# Patient Record
Sex: FEMALE | Race: WHITE | NOT HISPANIC OR LATINO | Employment: FULL TIME | URBAN - METROPOLITAN AREA
[De-identification: names, ages, dates, MRNs, and addresses within clinical notes are randomized per-mention and may not be internally consistent; named-entity substitution may affect disease eponyms.]

---

## 2024-02-26 ENCOUNTER — HOSPITAL ENCOUNTER (OUTPATIENT)
Dept: RADIOLOGY | Facility: CLINIC | Age: 49
Discharge: HOME | End: 2024-02-26
Payer: COMMERCIAL

## 2024-02-26 ENCOUNTER — OFFICE VISIT (OUTPATIENT)
Dept: ORTHOPEDIC SURGERY | Facility: CLINIC | Age: 49
End: 2024-02-26
Payer: COMMERCIAL

## 2024-02-26 DIAGNOSIS — E66.01 OBESITIES, MORBID (MULTI): ICD-10-CM

## 2024-02-26 DIAGNOSIS — M17.12 PRIMARY OSTEOARTHRITIS OF LEFT KNEE: Primary | ICD-10-CM

## 2024-02-26 DIAGNOSIS — M25.562 LEFT KNEE PAIN, UNSPECIFIED CHRONICITY: ICD-10-CM

## 2024-02-26 PROCEDURE — 99203 OFFICE O/P NEW LOW 30 MIN: CPT | Performed by: STUDENT IN AN ORGANIZED HEALTH CARE EDUCATION/TRAINING PROGRAM

## 2024-02-26 PROCEDURE — 73564 X-RAY EXAM KNEE 4 OR MORE: CPT | Mod: LEFT SIDE | Performed by: STUDENT IN AN ORGANIZED HEALTH CARE EDUCATION/TRAINING PROGRAM

## 2024-02-26 PROCEDURE — 73564 X-RAY EXAM KNEE 4 OR MORE: CPT | Mod: LT

## 2024-02-26 PROCEDURE — 99213 OFFICE O/P EST LOW 20 MIN: CPT | Performed by: STUDENT IN AN ORGANIZED HEALTH CARE EDUCATION/TRAINING PROGRAM

## 2024-02-26 NOTE — PROGRESS NOTES
Chief Complaint   Patient presents with    Left Knee - Pain     Xrays today   HX : MRI and xrays in 2023 @ [facility]       HPI  48-year-old female with longstanding history of left knee pain also known history of rheumatoid arthritis.  His recurrent history of chronic effusion likely related to rheumatoid.  Status post aspiration and cortisone injection by [facility].  States that she has her good days and bad days.  Some better than others currently seeing rheumatology.  Well-known to [facility] Dr. Severino.    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  No acute distress and breathing comfortably; pleasant and cooperative with the examination.  HEENT: Head is normocephalic and atraumatic.  Neck: Supple, no visible swelling.  Cardiovascular: Good perfusion to the affected extremity.  Lungs: No audible wheezing or labored breathing.  Abdomen: Nondistended  HEME/Lymph : No visible abnormalities bilateral lower extremity    Extremity:  Left knee:  Skin healthy and intact  No gross swelling or ecchymosis  Alignment: Neutral  Effusion: Mild  ROM: 10-1 10  Crepitance with range of motion  No pain with internal rotation of the hip  Tenderness to palpation: Peripatellar     No laxity to valgus stress  No laxity to varus stress  Negative Lachman´s test  Negative posterior drawer test  Mild pain with Alo´s test     Neurovascular exam normal distally  2+ DP pulse and good cap refill    Diagnostics:  XR knee left 4+ views    Result Date: 2/26/2024  Interpreted By:  Praveen Moreira III, STUDY: XR KNEE LEFT 4+ VIEWS; ;  2/26/2024 4:05 pm   INDICATION: Signs/Symptoms:pain.   COMPARISON: None.   ACCESSION NUMBER(S): [id number]   ORDERING CLINICIAN:  SHAYNE MOSQUERA   FINDINGS: 4 weight-bearing views left knee moderate degenerative changes about the 3 compartments of the knee. Bony definition limited due to body habitus. There is osteophytosis sclerosis consistent with mild to moderate knee arthritis.       No acute osseous abnormality     MACRO: None   Signed by: Shayne Mosquera III 2/26/2024 4:45 PM Dictation workstation:   [id number]       Procedure:  Procedures    Assessment:  48-year-old female with mild/moderate knee arthritis    Treatment plan:  The natural history of the condition and its associated treatment alternatives including surgical and nonsurgical options were discussed with the patient at length.  Patient has already attempted aspiration injection/physical therapy, oral anti-inflammatories continues have pain and discomfort about her knee.  Given findings of moderate arthritis may benefit from gel injection therapy.  She does have elevated BMI which is likely contributing to some of this pain and discomfort therefore I do think that she would benefit from bariatric surgery referral.  Will provide this today.  Will see how she does with gel injection therapy.  Feels improvement benefit from repeat MRI for discussion of diagnostic knee arthroscopy.  Given patient's elevated BMI not currently a candidate for total knee arthroplasty.  All of the patient's questions were answered.

## 2024-02-28 ENCOUNTER — TELEPHONE (OUTPATIENT)
Dept: SURGERY | Facility: CLINIC | Age: 49
End: 2024-02-28
Payer: COMMERCIAL

## 2024-03-01 ENCOUNTER — TELEPHONE (OUTPATIENT)
Dept: ORTHOPEDIC SURGERY | Facility: CLINIC | Age: 49
End: 2024-03-01
Payer: COMMERCIAL

## 2024-03-01 NOTE — TELEPHONE ENCOUNTER
Pt called and said that a bariatric surgery referral was put in , and she said that the bariatric office reached out , the pt thought it was for nutritional help and the office said it was for the sx . Pt wants referral changed. Can you please clarify with Dr Moreira.

## 2024-03-04 DIAGNOSIS — M17.12 PRIMARY OSTEOARTHRITIS OF LEFT KNEE: ICD-10-CM

## 2024-03-04 DIAGNOSIS — E66.01 OBESITIES, MORBID (MULTI): ICD-10-CM

## 2024-03-05 ENCOUNTER — TELEPHONE (OUTPATIENT)
Dept: SURGERY | Facility: CLINIC | Age: 49
End: 2024-03-05
Payer: COMMERCIAL

## 2024-03-07 ENCOUNTER — TELEPHONE (OUTPATIENT)
Dept: ORTHOPEDIC SURGERY | Facility: CLINIC | Age: 49
End: 2024-03-07
Payer: COMMERCIAL

## 2024-03-07 DIAGNOSIS — E66.01 OBESITIES, MORBID (MULTI): ICD-10-CM

## 2024-03-07 DIAGNOSIS — M17.12 PRIMARY OSTEOARTHRITIS OF LEFT KNEE: ICD-10-CM

## 2024-03-07 NOTE — TELEPHONE ENCOUNTER
Rosetta left message on 03/06/2024 wanting to know which series of gel injections would be better, insurance approved 3 injections, but she wants to know if she should get the 1 injection or the series of 3, which would be best for her.    The order that was put in for nutritionist, appointment can not be made because it says its for surgery. She would like a call back regarding these questions.

## 2024-03-07 NOTE — TELEPHONE ENCOUNTER
Has to do what the insurance approves.  There really isn't a difference between the 3 series and the one.      I placed a new order for referral to nutrition

## 2024-03-08 NOTE — TELEPHONE ENCOUNTER
Called patient, evelia letting her know that there really is no difference in the gel injections, you are getting the same amount just a little at a time with the 3 series and that the one injection is more painful because you are getting a lot of gel into the knee at one time.  I also told her that we have to do what the insurance approves, so if they approved the 3 series that is what we have to do.    Told her that I placed a new referral to a nutritionist, so they will be calling her in the next few days to schedule.

## 2024-03-18 ENCOUNTER — NUTRITION (OUTPATIENT)
Dept: NUTRITION | Facility: CLINIC | Age: 49
End: 2024-03-18
Payer: COMMERCIAL

## 2024-03-18 VITALS — BODY MASS INDEX: 40.82 KG/M2 | WEIGHT: 245 LBS | HEIGHT: 65 IN

## 2024-03-18 DIAGNOSIS — E66.01 OBESITIES, MORBID (MULTI): ICD-10-CM

## 2024-03-18 DIAGNOSIS — M17.12 PRIMARY OSTEOARTHRITIS OF LEFT KNEE: ICD-10-CM

## 2024-03-18 PROCEDURE — 97802 MEDICAL NUTRITION INDIV IN: CPT | Performed by: STUDENT IN AN ORGANIZED HEALTH CARE EDUCATION/TRAINING PROGRAM

## 2024-03-18 NOTE — PATIENT INSTRUCTIONS
Nutrition Education Material: Achieving a Healthy Weight, AND: 20 Ways to Enjoy More Fruits and Vegetables, Mindful Eating, NCM: Weight Loss Tips, My Weight Management Plan, and Weekly     Provided patient with my office phone # and email address for any further questions.

## 2024-03-18 NOTE — PROGRESS NOTES
"Nutrition Assessment     Reason for Visit:  Rosetta Morton is a 48 y.o. female who presents for Obesity    Anthropometrics:  Anthropometrics  Height: 165.1 cm (5' 5\")  Weight: 111 kg (245 lb)  BMI (Calculated): 40.77    Significant Past Medical Hx:  Rheumatoid Arthritis    Biochemical/Laboratory Data:  No results found for: \"HGBA1C\", \"CHOL\", \"LDLF\", \"TRIG\"     Pertinent Medications:  Folic Acid, Methotrexate, Plaquenil     Food And Nutrient Intake:  Food and Nutrient History  Food and Nutrient History: Met w/ pt to obtain diet hx and instruct on diet strategies for weight loss d/t obesity.  Reports she hurt her knee 1 year ago and since then has gained a lot of weight.  She was walking a lot, but now has limited physical activity.  Should soon be getting a knee injection which she hopes will help.  Works ~70 hours/wk so frequently skips meals, eats processed foods and eats large portions when she does eat.  Diet limited in fruits and vegs.  Fluid Intake: Drinks 1 can coke/d (for caffeine), water, hot green tea  Food Allergy: strawberries  Food Intolerance: none     Food Intake  Meal 1: skips  Meal 2: cafe at work: deli sandwich or soup with baked chips or cup of pasta or potato salad  Meal 3: Lean Cusine, nothing with it OR pasta, maybe veg, limited protein  Snacks: masha chips, guacamole, popcorn, doritos, occasional cake    Food Preparation  Cooking: Patient  Grocery Shopping: Patient  Dining Out: More than 3 times a week  Grocery Shopping Schedule: Has no shopping plan - goes as needed.   Convenience/Processed Foods: Frozen Meals, Processed/Frozen Convenience Meals, and Processed Salty Snacks  daily  Cooking Skills/Barriers: Low preference for cooking  Meal Preparation Habits: Has cooking skills, but does little cooking, Little preplanning of meals, Tends to use more prepared or convenience foods, and Generally does not eat 1 fruit or 1 vegetable each day.   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     Nutrition Diagnosis      Nutrition Diagnosis  Patient has Nutrition Diagnosis: Yes  Diagnosis Status (1): New  Nutrition Diagnosis 1: Obese  Related to (1): energy intake in excess of energy expenditure  As Evidenced by (1): diet hx and BMI 40    Nutrition Interventions/Recommendations   Food and Nutrition Delivery  Meals & Snacks: General Healthful Diet, Energy-modified diet  Nutrition Education  Nutrition Education Content: Content related nutrition education, Education on nutrition's influence on health  Goals: 1) Will aim to have consistent meal/snack times  2) Will aim to do meal prepping for 5 meals/week  3) Will aim to have at least 1 piece of fruit/d  4) Will aim to have 1-2 fistful of vegs with lunch and dinner  5) Will add movement as MD allows  Reviewed w/ pt strategies for weight loss including:  benefits of consistent meal and snack times;  mindful eating and paying attention to hunger and fullness cues;  MyPlate guidance for portions sizes;  strategies to increase fruits and vegetables in diet;  strategies for limiting low nutritional value foods; benefits of, and strategies for, menu planning and prepping meals ahead of meal times; benefits of fiber and protein with meals and snacks; benefits of regular movement/exercise.     Nutrition Monitoring and Evaluation   Food/Nutrient Related History Monitoring  Monitoring and Evaluation Plan: Meal/snack pattern, Protein intake, Fiber intake, Carbohydrate intake  Meal/Snack Pattern: Estimated meal and snack pattern, Food variety  Criteria: meals follow MyPlate guidelines  Estimated protein intake: Estimated protein intake  Criteria: source of protein included at meals/snacks  Estimated  carbohydrate intake: Estimated carbohydrate intake  Criteria: carb portion reduced at meals (1/4 or 1 C)  Estimated fiber intake: Estimated fiber intake  Criteria: includes source of fiber with meals/snacks  Body Composition/Growth/Weight History  Monitoring and Evaluation Plan: Weight  Weight: Measured weight  Criteria: weight loss of 1-2#/wk  Evaluation of effectiveness of diet intervention/diet eduction include:  changes in biochemical data; changes in anthropometric measurements; changes in diet hx; patient understanding and implementation of goals set with patient and diet education provided.

## 2024-03-29 ENCOUNTER — OFFICE VISIT (OUTPATIENT)
Dept: ORTHOPEDIC SURGERY | Facility: CLINIC | Age: 49
End: 2024-03-29
Payer: COMMERCIAL

## 2024-03-29 DIAGNOSIS — M17.12 PRIMARY OSTEOARTHRITIS OF LEFT KNEE: Primary | ICD-10-CM

## 2024-03-29 PROBLEM — M06.9 RHEUMATOID ARTHRITIS (MULTI): Status: ACTIVE | Noted: 2024-03-29

## 2024-03-29 PROBLEM — M25.562 ACUTE PAIN OF LEFT KNEE: Status: ACTIVE | Noted: 2023-01-11

## 2024-03-29 PROBLEM — E55.9 VITAMIN D DEFICIENCY: Status: ACTIVE | Noted: 2024-03-29

## 2024-03-29 PROBLEM — R26.2 DIFFICULTY WALKING: Status: ACTIVE | Noted: 2023-01-11

## 2024-03-29 PROBLEM — S83.242A TEAR OF MEDIAL MENISCUS OF LEFT KNEE: Status: ACTIVE | Noted: 2023-05-15

## 2024-03-29 PROBLEM — H52.4 PRESBYOPIA OF BOTH EYES: Status: ACTIVE | Noted: 2020-02-17

## 2024-03-29 PROBLEM — H52.223 REGULAR ASTIGMATISM OF BOTH EYES: Status: ACTIVE | Noted: 2018-11-21

## 2024-03-29 PROCEDURE — 20610 DRAIN/INJ JOINT/BURSA W/O US: CPT | Performed by: STUDENT IN AN ORGANIZED HEALTH CARE EDUCATION/TRAINING PROGRAM

## 2024-03-29 PROCEDURE — 1036F TOBACCO NON-USER: CPT | Performed by: STUDENT IN AN ORGANIZED HEALTH CARE EDUCATION/TRAINING PROGRAM

## 2024-03-29 RX ORDER — HYDROXYCHLOROQUINE SULFATE 200 MG/1
1 TABLET, FILM COATED ORAL 2 TIMES DAILY
COMMUNITY

## 2024-03-29 RX ORDER — METHOTREXATE 2.5 MG/1
TABLET ORAL
COMMUNITY
Start: 2024-03-27

## 2024-03-29 RX ORDER — FOLIC ACID 1 MG/1
1 TABLET ORAL
COMMUNITY
Start: 2021-01-27 | End: 2024-04-01

## 2024-03-29 ASSESSMENT — PAIN - FUNCTIONAL ASSESSMENT: PAIN_FUNCTIONAL_ASSESSMENT: NO/DENIES PAIN

## 2024-03-29 NOTE — PROGRESS NOTES
History of Present Illness  Patient returns today for an injection with viscosupplementation. The patient endorsing knee pain refractory to cortisone injections and oral medications     Review of Systems   GENERAL: Negative for malaise, significant weight loss, fever  MUSCULOSKELETAL: see HPI  NEURO:  Negative     Exam  Trace effusion  Tenderness over medial and lateral joint line     Assessment:  Patient with known osteoarthritis of the knee     Plan:  Visco injection provided, patient tolerated it well. See procedure note.     Injection performed as detailed in the procedure note below.      PROCEDURE NOTE:  Physician:Praveen Moreira III, MD     Prior to the procedure, the patient's allergies were reviewed. The procedure site was marked and time out performed. The procedure team checked for proper functioning of devices and supplies to be used for the procedure. The procedure team reviewed the relevant diagnostic tests pertinent to the procedure. The risks, benefits and anticipated outcomes of the procedure, the risks and benefits of the alternatives to the procedure, and the roles and tasks of the personnel to be involved were discussed with the patient, the patient verbalized understanding and consenting to the procedure.        Procedure details:  The injection site was prepped in the usual sterile manner.   Ethyl chloride mist spray was used to numb the skin.    Gelsyn was injected into the left knee.  After the injection, a bandage was placed over the injection site.   The patient tolerated the procedure well with no adverse effects.   Post-injection instructions were reviewed with the patient and the patient voiced understanding of these instructions.      L Inj/Asp: L knee on 3/29/2024 11:12 AM  Indications: pain  Details: 21 G needle, anterolateral approach  Medications: 16.8 mg sodium hyaluronate 16.8 mg/2 mL  Outcome: tolerated well, no immediate complications  Procedure, treatment alternatives, risks  and benefits explained, specific risks discussed. Consent was given by the patient. Immediately prior to procedure a time out was called to verify the correct patient, procedure, equipment, support staff and site/side marked as required. Patient was prepped and draped in the usual sterile fashion.

## 2024-04-03 ENCOUNTER — OFFICE VISIT (OUTPATIENT)
Dept: ORTHOPEDIC SURGERY | Facility: CLINIC | Age: 49
End: 2024-04-03
Payer: COMMERCIAL

## 2024-04-03 DIAGNOSIS — M17.12 PRIMARY OSTEOARTHRITIS OF LEFT KNEE: Primary | ICD-10-CM

## 2024-04-03 PROCEDURE — 1036F TOBACCO NON-USER: CPT | Performed by: STUDENT IN AN ORGANIZED HEALTH CARE EDUCATION/TRAINING PROGRAM

## 2024-04-03 PROCEDURE — 20610 DRAIN/INJ JOINT/BURSA W/O US: CPT | Performed by: STUDENT IN AN ORGANIZED HEALTH CARE EDUCATION/TRAINING PROGRAM

## 2024-04-03 NOTE — PROGRESS NOTES
Chief Complaint   Patient presents with    Left Knee - Follow-up     Gelsyn #3     History of Present Illness  Patient returns today for an injection with viscosupplementation. The patient endorsing knee pain refractory to cortisone injections and oral medications     Review of Systems   GENERAL: Negative for malaise, significant weight loss, fever  MUSCULOSKELETAL: see HPI  NEURO:  Negative     Exam  Trace effusion  Tenderness over medial and lateral joint line     Assessment:  Patient with known osteoarthritis of the knee     Plan:  Visco injection provided, patient tolerated it well. See procedure note.     Injection performed as detailed in the procedure note below.      PROCEDURE NOTE:  Physician:Praveen Moreira III, MD     Prior to the procedure, the patient's allergies were reviewed. The procedure site was marked and time out performed. The procedure team checked for proper functioning of devices and supplies to be used for the procedure. The procedure team reviewed the relevant diagnostic tests pertinent to the procedure. The risks, benefits and anticipated outcomes of the procedure, the risks and benefits of the alternatives to the procedure, and the roles and tasks of the personnel to be involved were discussed with the patient, the patient verbalized understanding and consenting to the procedure.        Procedure details:  The injection site was prepped in the usual sterile manner.   Ethyl chloride mist spray was used to numb the skin.    Gelsyn was injected into the left knee.  After the injection, a bandage was placed over the injection site.   The patient tolerated the procedure well with no adverse effects.   Post-injection instructions were reviewed with the patient and the patient voiced understanding of these instructions.        L Inj/Asp: L knee on 4/3/2024 12:37 PM  Indications: pain  Details: 21 G needle, anterolateral approach  Medications: 16.8 mg sodium hyaluronate 16.8 mg/2 mL  Outcome:  tolerated well, no immediate complications  Procedure, treatment alternatives, risks and benefits explained, specific risks discussed. Consent was given by the patient. Immediately prior to procedure a time out was called to verify the correct patient, procedure, equipment, support staff and site/side marked as required. Patient was prepped and draped in the usual sterile fashion.

## 2024-04-04 ENCOUNTER — APPOINTMENT (OUTPATIENT)
Dept: ORTHOPEDIC SURGERY | Facility: CLINIC | Age: 49
End: 2024-04-04
Payer: COMMERCIAL

## 2024-04-15 ENCOUNTER — OFFICE VISIT (OUTPATIENT)
Dept: ORTHOPEDIC SURGERY | Facility: CLINIC | Age: 49
End: 2024-04-15
Payer: COMMERCIAL

## 2024-04-15 DIAGNOSIS — M17.12 PRIMARY OSTEOARTHRITIS OF LEFT KNEE: Primary | ICD-10-CM

## 2024-04-15 PROCEDURE — 20610 DRAIN/INJ JOINT/BURSA W/O US: CPT | Performed by: ORTHOPAEDIC SURGERY

## 2024-04-15 PROCEDURE — 1036F TOBACCO NON-USER: CPT | Performed by: ORTHOPAEDIC SURGERY

## 2024-04-15 NOTE — PROGRESS NOTES
Rosetta is here for Gelsyn-3 injection #3 into the left knee.    L Inj/Asp: L knee on 4/15/2024 3:45 PM  Details: 22 G needle, lateral approach  Medications: 16.8 mg sodium hyaluronate 16.8 mg/2 mL  Outcome: tolerated well, no immediate complications  Procedure, treatment alternatives, risks and benefits explained, specific risks discussed. Consent was given by the patient. Immediately prior to procedure a time out was called to verify the correct patient, procedure, equipment, support staff and site/side marked as required. Patient was prepped and draped in the usual sterile fashion.         She will ice and work on range of motion and exercises as directed.    She will follow up with Dr. Moreira in two months to assess progress.

## 2024-04-18 ENCOUNTER — APPOINTMENT (OUTPATIENT)
Dept: ORTHOPEDIC SURGERY | Facility: CLINIC | Age: 49
End: 2024-04-18
Payer: COMMERCIAL

## 2024-06-17 ENCOUNTER — APPOINTMENT (OUTPATIENT)
Dept: ORTHOPEDIC SURGERY | Facility: CLINIC | Age: 49
End: 2024-06-17
Payer: COMMERCIAL

## 2024-11-25 ENCOUNTER — OFFICE VISIT (OUTPATIENT)
Dept: ORTHOPEDIC SURGERY | Facility: CLINIC | Age: 49
End: 2024-11-25
Payer: COMMERCIAL

## 2024-11-25 DIAGNOSIS — M17.12 PRIMARY OSTEOARTHRITIS OF LEFT KNEE: Primary | ICD-10-CM

## 2024-11-25 PROCEDURE — 99213 OFFICE O/P EST LOW 20 MIN: CPT | Performed by: STUDENT IN AN ORGANIZED HEALTH CARE EDUCATION/TRAINING PROGRAM

## 2024-11-25 NOTE — PROGRESS NOTES
Chief Complaint   Patient presents with    Left Knee - Follow-up     Last Gelsyn (#3) 4/15/24       HPI  48-year-old female with longstanding history of left knee pain also known history of rheumatoid arthritis.  His recurrent history of chronic effusion likely related to rheumatoid.  Status post aspiration and cortisone injection by [facility] [place].  States that she has her good days and bad days.  Some better than others currently seeing rheumatology.  Well-known to [facility] [place] Dr. Severino.    We have proceeded with gel injections is provided very short course of relief continues to have pain about her knee.    Past Medical History:   Diagnosis Date    Pemphigus vulgaris (Multi) 01/31/2021    Pemphigus vulgaris       Past Surgical History:   Procedure Laterality Date    OTHER SURGICAL HISTORY  01/27/2021    No history of surgery        Allergies   Allergen Reactions    Azithromycin Unknown    Erythromycin (Bulk) GI Upset    Levofloxacin Unknown     Concerns that Levaquin brought on pemphigus    Tetracycline Diarrhea        Physical exam    General: Alert and oriented to place, person, and time.  No acute distress and breathing comfortably; pleasant and cooperative with the examination.  HEENT: Head is normocephalic and atraumatic.  Neck: Supple, no visible swelling.  Cardiovascular: Good perfusion to the affected extremity.  Lungs: No audible wheezing or labored breathing.  Abdomen: Nondistended  HEME/Lymph : No visible abnormalities bilateral lower extremity    Extremity:  Left knee:  Skin healthy and intact  No gross swelling or ecchymosis  Alignment: Neutral  Effusion: Mild  ROM: 10-1 10  Crepitance with range of motion  No pain with internal rotation of the hip  Tenderness to palpation: Peripatellar     No laxity to valgus stress  No laxity to varus stress  Negative Lachman´s test  Negative posterior drawer test  Mild pain with Alo´s test     Neurovascular exam normal distally  2+ DP pulse and  good cap refill    Diagnostics:  XR knee left 4+ views    Result Date: 2/26/2024  Interpreted By:  Shayne Mosquera III, STUDY: XR KNEE LEFT 4+ VIEWS; ;  2/26/2024 4:05 pm   INDICATION: Signs/Symptoms:pain.   COMPARISON: None.   ACCESSION NUMBER(S): [id number]   ORDERING CLINICIAN: SHAYNE MOSQUERA   FINDINGS: 4 weight-bearing views left knee moderate degenerative changes about the 3 compartments of the knee. Bony definition limited due to body habitus. There is osteophytosis sclerosis consistent with mild to moderate knee arthritis.       No acute osseous abnormality     MACRO: None   Signed by: Shayne Mosquera III 2/26/2024 4:45 PM Dictation workstation:   [id number]       Procedure:  Procedures    Assessment:  48-year-old female with mild/moderate knee arthritis    Treatment plan:  The natural history of the condition and its associated treatment alternatives including surgical and nonsurgical options were discussed with the patient at length.  Patient is going to provide MRI results so that I can review these for further discussion of potential treatment option.  Patient is hoping to proceed with diagnostic knee arthroscopy.  She has not candidate for knee replacement given current BMI.  She will follow-up next week for discussion of these results.  Operative and nonoperative treatment modalities considered and discussed with patient in detail   all of the patient's questions were answered.

## 2024-12-04 ENCOUNTER — OFFICE VISIT (OUTPATIENT)
Dept: ORTHOPEDIC SURGERY | Facility: CLINIC | Age: 49
End: 2024-12-04
Payer: COMMERCIAL

## 2024-12-04 DIAGNOSIS — S83.242A ACUTE MEDIAL MENISCAL TEAR, LEFT, INITIAL ENCOUNTER: Primary | ICD-10-CM

## 2024-12-04 PROCEDURE — 99214 OFFICE O/P EST MOD 30 MIN: CPT | Performed by: STUDENT IN AN ORGANIZED HEALTH CARE EDUCATION/TRAINING PROGRAM

## 2024-12-04 PROCEDURE — 99213 OFFICE O/P EST LOW 20 MIN: CPT | Performed by: STUDENT IN AN ORGANIZED HEALTH CARE EDUCATION/TRAINING PROGRAM

## 2024-12-04 NOTE — PROGRESS NOTES
Chief Complaint   Patient presents with    Left Knee - Follow-up     MRI review       HPI  48-year-old female with longstanding history of left knee pain also known history of rheumatoid arthritis.  His recurrent history of chronic effusion likely related to rheumatoid.  Status post aspiration and cortisone injection by [facility].  States that she has her good days and bad days.  Some better than others currently seeing rheumatology.  Well-known to [facility] Dr. Severino.    We have proceeded with gel injections is provided very short course of relief continues to have pain about her knee.  Patient did get an MRI 2023 presents for discussion of these results at [facility]    Past Medical History:   Diagnosis Date    Pemphigus vulgaris (Multi) 01/31/2021    Pemphigus vulgaris       Past Surgical History:   Procedure Laterality Date    OTHER SURGICAL HISTORY  01/27/2021    No history of surgery        Allergies   Allergen Reactions    Azithromycin Unknown    Erythromycin (Bulk) GI Upset    Levofloxacin Unknown     Concerns that Levaquin brought on pemphigus    Tetracycline Diarrhea        Physical exam    General: Alert and oriented to place, person, and time.  No acute distress and breathing comfortably; pleasant and cooperative with the examination.  HEENT: Head is normocephalic and atraumatic.  Neck: Supple, no visible swelling.  Cardiovascular: Good perfusion to the affected extremity.  Lungs: No audible wheezing or labored breathing.  Abdomen: Nondistended  HEME/Lymph : No visible abnormalities bilateral lower extremity    Extremity:  Left knee:  Skin healthy and intact  No gross swelling or ecchymosis  Alignment: Neutral  Effusion: Mild  ROM: 10-1 10  Crepitance with range of motion  No pain with internal rotation of the hip  Tenderness to palpation: Peripatellar     No laxity to valgus stress  No laxity to varus stress  Negative Lachman´s test  Negative posterior drawer test  Mild pain  with Alo´s test     Neurovascular exam normal distally  2+ DP pulse and good cap refill    Diagnostics:  XR knee left 4+ views    Result Date: 2/26/2024  Interpreted By:  Shayne Mosquera III, STUDY: XR KNEE LEFT 4+ VIEWS; ;  2/26/2024 4:05 pm   INDICATION: Signs/Symptoms:pain.   COMPARISON: None.   ACCESSION NUMBER(S): [id number]   ORDERING CLINICIAN: SHAYNE MOSQUERA   FINDINGS: 4 weight-bearing views left knee moderate degenerative changes about the 3 compartments of the knee. Bony definition limited due to body habitus. There is osteophytosis sclerosis consistent with mild to moderate knee arthritis.       No acute osseous abnormality     MACRO: None   Signed by: Shayne Mosquera III 2/26/2024 4:45 PM Dictation workstation:   [id number]     MRI reviewed my interpretation as follows MRI date May 21, 2023: Large complex effusion about the knee with synovitis, low-grade MCL sprain,  Procedure:  Procedures    Assessment:  48-year-old female with mild/moderate knee arthritis, concerns for meniscus tear, history of rheumatoid arthritis    Treatment plan:  Given MRI was over a year ago I do recommend to proceed with updated MRI prior to further treatment  Given mechanical symptoms I do think patient has a meniscus tear likely leading to her a lot of pain and discomfort.  Will get an updated MRI.  The history and clinical exam are consistent with intraarticular pathology and therefore MRI is medically indicated to evaluate the soft tissues of the joint. Follow up in one week or after completion of the MRI to advance management accordingly  The patient understands and agrees with the plan.  Given her history of rheumatoid arthritis I do think that there also may be a component here that may be leading to some pain and discomfort.  Patient is tired of feeling these mechanical type symptoms does wish to proceed with surgical treatment.

## 2024-12-23 ENCOUNTER — HOSPITAL ENCOUNTER (OUTPATIENT)
Dept: RADIOLOGY | Facility: CLINIC | Age: 49
Discharge: HOME | End: 2024-12-23
Payer: COMMERCIAL

## 2024-12-23 DIAGNOSIS — S83.242A ACUTE MEDIAL MENISCAL TEAR, LEFT, INITIAL ENCOUNTER: ICD-10-CM

## 2024-12-23 PROCEDURE — 73721 MRI JNT OF LWR EXTRE W/O DYE: CPT | Mod: LT

## 2024-12-23 PROCEDURE — 73721 MRI JNT OF LWR EXTRE W/O DYE: CPT | Mod: LEFT SIDE | Performed by: RADIOLOGY

## 2024-12-27 ENCOUNTER — OFFICE VISIT (OUTPATIENT)
Dept: ORTHOPEDIC SURGERY | Facility: CLINIC | Age: 49
End: 2024-12-27
Payer: COMMERCIAL

## 2024-12-27 DIAGNOSIS — S83.242A ACUTE MEDIAL MENISCAL TEAR, LEFT, INITIAL ENCOUNTER: Primary | ICD-10-CM

## 2024-12-27 PROCEDURE — 99214 OFFICE O/P EST MOD 30 MIN: CPT | Mod: 25 | Performed by: STUDENT IN AN ORGANIZED HEALTH CARE EDUCATION/TRAINING PROGRAM

## 2024-12-27 NOTE — PROGRESS NOTES
Chief Complaint   Patient presents with    Left Knee - Follow-up     MMT  MRI review        HPI  48-year-old female with longstanding history of left knee pain also known history of rheumatoid arthritis.  His recurrent history of chronic effusion likely related to rheumatoid.  Status post aspiration and cortisone injection by [facility].  States that she has her good days and bad days.  Some better than others currently seeing rheumatology.  Well-known to [facility] Dr. Severino.    We have proceeded with gel injections is provided very short course of relief continues to have pain about her knee.  He has attempted aspiration injections in the past which provided very short course of relief is attempted activity modifications oral anti-inflammatories with little to no relief.    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  No acute distress and breathing comfortably; pleasant and cooperative with the examination.  HEENT: Head is normocephalic and atraumatic.  Neck: Supple, no visible swelling.  Cardiovascular: Good perfusion to the affected extremity.  Lungs: No audible wheezing or labored breathing.  Abdomen: Nondistended  HEME/Lymph : No visible abnormalities bilateral lower extremity    Extremity:  Left knee:  Skin healthy and intact  No gross swelling or ecchymosis  Alignment: Neutral  Effusion: Mild  ROM: 10-1 10  Crepitance with range of motion  No pain with internal rotation of the hip  Tenderness to  palpation: Peripatellar     No laxity to valgus stress  No laxity to varus stress  Negative Lachman´s test  Negative posterior drawer test  Mild pain with Alo´s test     Neurovascular exam normal distally  2+ DP pulse and good cap refill    Diagnostics:  MR knee left wo IV contrast    Result Date: 12/23/2024  Interpreted By:  Harjeet Grant, STUDY: MR KNEE LEFT WO IV CONTRAST;   INDICATION: Signs/Symptoms:mmt.   COMPARISON: Plain film radiographs February 26, 2024   ACCESSION NUMBER(S): [id number]   ORDERING CLINICIAN: SHAYNE MOSQUERA   TECHNIQUE: Routine multiplanar multisequential MRI left knee without contrast.   FINDINGS: Lateral meniscus as a macerated appearance to both the body segment and anterior horn.   Medial meniscus markedly diminutive body segment and posterior horn. I am uncertain if the appearance of both the lateral and medial meniscus are from prior postmeniscectomy changes or related to extensive degenerative type tearing. Correlate with surgical history.   Anterior cruciate ligament normal.   Posterior cruciate ligament normal.   Extensor tendons are normal.   Collateral ligaments are normal.   Large joint effusion. There is extensive heterogeneous material throughout the entirety of the joint space which suggests a possible synovitis such as rheumatoid. Less likely chronic hemarthrosis a consideration.   Advanced osteoarthritis particularly in the lateral compartment with large areas of full-thickness loss of the articular cartilage both from the lateral femoral and tibial articular surface with a sizable amount of subchondral sclerosis and edema mostly of the femur.   Less advanced moderate medial compartment arthrosis and moderate patellofemoral arthrosis with a large amount of full-thickness chondral loss from the apex and medial facet.   No evidence of fracture.   No marrow replacement lesion.         Advanced arthritic changes of the left knee worst in the lateral compartment. A large  joint effusion with extensive heterogeneous material/synovitis is concerning for an inflammatory arthropathy such as rheumatoid or less likely chronic intra-articular hemorrhage.   Macerated anterior horn and body lateral meniscus as well as posterior horn and body medial meniscus which could be related to longstanding arthrosis or post meniscectomy changes.     Signed by: Harjeet Grant 12/23/2024 9:18 AM Dictation workstation:   [id number]     Procedure:  Procedures    Assessment:  48-year-old female with moderate knee arthritis, history of rheumatoid arthritis, large knee effusion, medial and lateral meniscus tears    Treatment plan:  MRI findings discussed with patient in detail today she does have moderate knee arthritis, medial lateral meniscus tear is large effusion about the knee.  I discussed multiple forms of conservative treatment to include continued activity modifications oral anti-inflammatories injections gel and cortisone which she has failed already.  She is not interested in knee replacement at this point in time does wish to proceed with diagnostic knee arthroscopy partial medial lateral meniscectomy discussed with her that this will not improve her knee arthritis is demonstrated on MRI however may help with some of the mechanical type symptoms.  Will obtain medical clearance prior to proceeding.  Discussed that we will send a pathology specimen for evaluation for rheumatology.    We discussed the options of operative versus nonoperative management with the attendant risks and benefits and the patient is interested in surgical treatment. I have discussed the alternatives of continued nonoperative treatment with observation, physical therapy, and / or medication versus surgery with the patient and we have thoughtfully considered these options. The patient wishes to proceed with surgical treatment.     We will proceed with left knee diagnostic arthroscopy and partial medial lateral  meniscectomy    The planned surgical procedure includes examination under anesthesia. Anesthetic risks will be discussed with the patient by the anesthetist. Arthroscopic evaluation will be performed of the knee joint.  Then an arthroscopic procedure will be performed which may include debridement or repair of the the meniscus or cartilage, synovectomy, and removal of loose bodies.  In addition, if there are advanced degenerative changes I have advised the patient that this may indeed be a progressive process, ultimately resulting in further pain at some point in the future.    Risks of the procedure include but are not limited to infection, bleeding, persistent pain, compartment syndrome, neurologic injury, pressure sores or burns related to positioning or equipment, failure of repair if performed, weakness, arthrofibrosis or stiffness of the joint, limited function and/or limited use of the extremity. The rare complication of death was discussed with the patient. Also, the possible need for revision surgery was discussed.     All this was discussed with the patient and consent obtained. All questions were answered. The patient understands and will consider the surgical options with the above risks in mind. If repair is performed of the meniscus a brace may be provided as  part of a treatment plan which will include wearing the immobilizer at all times.    Regarding DVT prophylaxis, recommend generalized ambulation, patient is low risk for DVT.    We will provide a prescription for Norco 5/325 15 tabs the day prior to surgery.  The patient will pick this up today before surgery in anticipation for surgery/postoperative pain control.

## 2024-12-31 ENCOUNTER — LAB (OUTPATIENT)
Dept: LAB | Facility: LAB | Age: 49
End: 2024-12-31
Payer: COMMERCIAL

## 2024-12-31 ENCOUNTER — TELEPHONE (OUTPATIENT)
Dept: ORTHOPEDIC SURGERY | Facility: CLINIC | Age: 49
End: 2024-12-31

## 2024-12-31 ENCOUNTER — HOSPITAL ENCOUNTER (OUTPATIENT)
Dept: CARDIOLOGY | Facility: HOSPITAL | Age: 49
Discharge: HOME | End: 2024-12-31
Payer: COMMERCIAL

## 2024-12-31 DIAGNOSIS — Z01.818 PRE-OP EXAMINATION: ICD-10-CM

## 2024-12-31 LAB
ALBUMIN SERPL BCP-MCNC: 3.6 G/DL (ref 3.4–5)
ALP SERPL-CCNC: 88 U/L (ref 33–110)
ALT SERPL W P-5'-P-CCNC: 8 U/L (ref 7–45)
ANION GAP SERPL CALC-SCNC: 12 MMOL/L (ref 10–20)
APTT PPP: 31 SECONDS (ref 27–38)
AST SERPL W P-5'-P-CCNC: 12 U/L (ref 9–39)
ATRIAL RATE: 90 BPM
BASOPHILS # BLD AUTO: 0.03 X10*3/UL (ref 0–0.1)
BASOPHILS NFR BLD AUTO: 0.6 %
BILIRUB SERPL-MCNC: 0.5 MG/DL (ref 0–1.2)
BUN SERPL-MCNC: 11 MG/DL (ref 6–23)
CALCIUM SERPL-MCNC: 9.2 MG/DL (ref 8.6–10.3)
CHLORIDE SERPL-SCNC: 103 MMOL/L (ref 98–107)
CO2 SERPL-SCNC: 25 MMOL/L (ref 21–32)
CREAT SERPL-MCNC: 0.79 MG/DL (ref 0.5–1.05)
EGFRCR SERPLBLD CKD-EPI 2021: >90 ML/MIN/1.73M*2
EOSINOPHIL # BLD AUTO: 0.09 X10*3/UL (ref 0–0.7)
EOSINOPHIL NFR BLD AUTO: 1.7 %
ERYTHROCYTE [DISTWIDTH] IN BLOOD BY AUTOMATED COUNT: 13.7 % (ref 11.5–14.5)
GLUCOSE SERPL-MCNC: 90 MG/DL (ref 74–99)
HCT VFR BLD AUTO: 40.9 % (ref 36–46)
HGB BLD-MCNC: 12.5 G/DL (ref 12–16)
IMM GRANULOCYTES # BLD AUTO: 0.02 X10*3/UL (ref 0–0.7)
IMM GRANULOCYTES NFR BLD AUTO: 0.4 % (ref 0–0.9)
INR PPP: 1 (ref 0.9–1.1)
LYMPHOCYTES # BLD AUTO: 1.3 X10*3/UL (ref 1.2–4.8)
LYMPHOCYTES NFR BLD AUTO: 24.3 %
MCH RBC QN AUTO: 26.6 PG (ref 26–34)
MCHC RBC AUTO-ENTMCNC: 30.6 G/DL (ref 32–36)
MCV RBC AUTO: 87 FL (ref 80–100)
MONOCYTES # BLD AUTO: 0.48 X10*3/UL (ref 0.1–1)
MONOCYTES NFR BLD AUTO: 9 %
NEUTROPHILS # BLD AUTO: 3.42 X10*3/UL (ref 1.2–7.7)
NEUTROPHILS NFR BLD AUTO: 64 %
NRBC BLD-RTO: 0 /100 WBCS (ref 0–0)
P AXIS: 30 DEGREES
P OFFSET: 200 MS
P ONSET: 156 MS
PLATELET # BLD AUTO: 322 X10*3/UL (ref 150–450)
POTASSIUM SERPL-SCNC: 4.4 MMOL/L (ref 3.5–5.3)
PR INTERVAL: 132 MS
PROT SERPL-MCNC: 7.6 G/DL (ref 6.4–8.2)
PROTHROMBIN TIME: 11.6 SECONDS (ref 9.8–12.8)
Q ONSET: 222 MS
QRS COUNT: 14 BEATS
QRS DURATION: 86 MS
QT INTERVAL: 354 MS
QTC CALCULATION(BAZETT): 433 MS
QTC FREDERICIA: 405 MS
R AXIS: 36 DEGREES
RBC # BLD AUTO: 4.7 X10*6/UL (ref 4–5.2)
SODIUM SERPL-SCNC: 136 MMOL/L (ref 136–145)
T AXIS: 39 DEGREES
T OFFSET: 399 MS
VENTRICULAR RATE: 90 BPM
WBC # BLD AUTO: 5.3 X10*3/UL (ref 4.4–11.3)

## 2024-12-31 PROCEDURE — 93005 ELECTROCARDIOGRAM TRACING: CPT

## 2024-12-31 NOTE — TELEPHONE ENCOUNTER
"   Recent Vitals     1/27/2021  1601 3/18/2024  1455 12/23/2024  0715 Most Recent Value   Weight: -- 111 kg (245 lb) 109 kg (240 lb) 109 kg (240 lb)  as of 12/23/2024   Body Mass Index:    39.94 kg/m² Abnormal   1.651 m (5' 5\")  as of 12/23/2024  109 kg (240 lb)  as of 12/23/2024   BP: 150/85 -- -- 150/85  as of 1/27/2021   Heart Rate: -- -- -- 82  as of 1/27/2021   Resp: -- -- -- 14  as of 1/27/2021   Temp: -- -- -- 36.2 °C (97.2 °F)  as of 1/27/2021   SpO2: -- -- -- 99%  as of 1/27/2021   Height: -- 1.651 m (5' 5\") 1.651 m (5' 5\") 1.651 m (5' 5\")  as of 12/23/2024   Peak Flow: -- -- -- Not taken   LMP:    None   Body Surface Area:    2.24 m²  1.651 m (5' 5\")  as of 12/23/2024  109 kg (240 lb)  as of 12/23/2024   Adjusted Weight:    77.7 kg (171 lb 6.4 oz)  Actual Weight:  109 kg (240 lb)  as of 12/23/2024  Ideal Weight:  57 kg (125 lb 10.6 oz)  as of 12/23/2024   Dosing Weight:    None     "

## 2025-01-07 ENCOUNTER — TELEPHONE (OUTPATIENT)
Dept: ORTHOPEDIC SURGERY | Facility: CLINIC | Age: 50
End: 2025-01-07
Payer: COMMERCIAL

## 2025-01-07 NOTE — TELEPHONE ENCOUNTER
01/07/25  LVM for pt to contact DME dept re availability of crutches for post-op use following upcoming sx.

## 2025-01-08 LAB
ATRIAL RATE: 90 BPM
P AXIS: 30 DEGREES
P OFFSET: 200 MS
P ONSET: 156 MS
PR INTERVAL: 132 MS
Q ONSET: 222 MS
QRS COUNT: 14 BEATS
QRS DURATION: 86 MS
QT INTERVAL: 354 MS
QTC CALCULATION(BAZETT): 433 MS
QTC FREDERICIA: 405 MS
R AXIS: 36 DEGREES
T AXIS: 39 DEGREES
T OFFSET: 399 MS
VENTRICULAR RATE: 90 BPM

## 2025-01-13 DIAGNOSIS — S83.242A ACUTE MEDIAL MENISCAL TEAR, LEFT, INITIAL ENCOUNTER: Primary | ICD-10-CM

## 2025-01-13 RX ORDER — HYDROCODONE BITARTRATE AND ACETAMINOPHEN 5; 325 MG/1; MG/1
1 TABLET ORAL EVERY 6 HOURS PRN
Qty: 15 TABLET | Refills: 0 | Status: SHIPPED | OUTPATIENT
Start: 2025-01-13

## 2025-01-14 PROCEDURE — 29880 ARTHRS KNE SRG MNISECTMY M&L: CPT | Performed by: STUDENT IN AN ORGANIZED HEALTH CARE EDUCATION/TRAINING PROGRAM

## 2025-01-15 ENCOUNTER — OFFICE VISIT (OUTPATIENT)
Dept: ORTHOPEDIC SURGERY | Facility: CLINIC | Age: 50
End: 2025-01-15
Payer: COMMERCIAL

## 2025-01-15 ENCOUNTER — TELEPHONE (OUTPATIENT)
Dept: ORTHOPEDIC SURGERY | Facility: CLINIC | Age: 50
End: 2025-01-15
Payer: COMMERCIAL

## 2025-01-15 DIAGNOSIS — Z98.890 STATUS POST ARTHROSCOPY OF LEFT KNEE: Primary | ICD-10-CM

## 2025-01-15 PROCEDURE — 99211 OFF/OP EST MAY X REQ PHY/QHP: CPT | Performed by: STUDENT IN AN ORGANIZED HEALTH CARE EDUCATION/TRAINING PROGRAM

## 2025-01-15 NOTE — TELEPHONE ENCOUNTER
Pt mother called stating overnight the dressing fell off and was asking what they should do. Informed them we would talk to the doctor and get back to them this afternoon with instructions. Pt is s/p Lt MMT/LMT 1/14/25.

## 2025-01-15 NOTE — PROGRESS NOTES
No chief complaint on file.      HPI  Patient presents today for early follow-up/incision check of her patient left knee.  Patient underwent ORIF left knee partial medial and lateral meniscectomy yesterday.  Patient states that her dressing fell down today and she did notice some bleeding drainage procedure and the dressing so wanted to be seen for evaluation.  Denies any serious pain at this time.  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 Sig Documenting Provider Last Dose Status   hydroxychloroquine (Plaquenil) 200 mg tablet [id number]  Take 1 tablet (200 mg) by mouth 2 times a day. Historical Provider, MD  Active   methotrexate (Trexall) 2.5 mg tablet [id number]  TAKE 4 TABLETS BY MOUTH ONCE WEEKLY Historical Provider, MD  Active                    Allergies   Allergen Reactions    Azithromycin Unknown    Erythromycin (Bulk) GI Upset    Levofloxacin Unknown     Concerns that Levaquin brought on pemphigus    Tetracycline Diarrhea       Social History     Socioeconomic History    Marital status:      Spouse name: Not on file    Number of children: Not on file    Years of education: Not on file    Highest education level: Not on file   Occupational History    Not on file   Tobacco Use    Smoking status: Never    Smokeless tobacco: Never   Substance and Sexual Activity    Alcohol use: Not on file    Drug use: Not on file    Sexual activity: Not on file   Other Topics Concern    Not on file   Social History Narrative    Not on file     Social Drivers of Health     Financial Resource Strain: Not on file   Food Insecurity: Not on file   Transportation Needs: Not on file   Physical Activity: Not on file   Stress: Not on file   Social  Connections: Not on file   Intimate Partner Violence: Not on file   Housing Stability: Not on file       Past Surgical History:   Procedure Laterality Date    OTHER SURGICAL HISTORY  01/27/2021    No history of surgery          Review of Systems   GENERAL: Negative  CV: NEGATIVE  RESPIRATORY: Negative  GI: Negative  MUSCULOSKELETAL: See HPI  SKIN: Negative  NEURO:  Negative     Physical Exam:  General: No acute distress alert and orient x3, alert and cooperative  HEENT: Normocephalic atraumatic.  Pupils round and reactive.  Trachea midline  Cardiovascular: Regular rate and rhythm.  Respiratory: Bilateral chest rise.  Breathing unlabored.  Abdomen: Nontender nondistended no rebounding.  See formal musculoskeletal below:    Affected Extremity:  Left knee:  Moderate swelling noted  Moderate ecchymosis noted  Sutures in place at incision sites.  No active drainage no surrounding erythema  Mild tenderness to palpation about knee diffusely  Good range of motion  Negative Carolin  Good cap refill  Neurovascularly intact distal lower extremity    Diagnostics:    Procedures  Procedures     Assessment:  49-year-old female status post left knee arthroscopy partial medial and lateral meniscectomy 1 day ago    Treatment plan:  Overall patient doing well.  Here for reassurance/bandage change  Incisions look healthy today will rebandage her knee with ABD and Ace wrap  All of the patient's questions concerns answered  Patient will keep 2-week postop follow-up to discuss surgical details and range of motion check    Hang Bean PA-C

## 2025-01-27 ENCOUNTER — OFFICE VISIT (OUTPATIENT)
Dept: ORTHOPEDIC SURGERY | Facility: CLINIC | Age: 50
End: 2025-01-27
Payer: COMMERCIAL

## 2025-01-27 DIAGNOSIS — Z98.890 STATUS POST ARTHROSCOPY OF LEFT KNEE: Primary | ICD-10-CM

## 2025-01-27 PROCEDURE — 99211 OFF/OP EST MAY X REQ PHY/QHP: CPT | Performed by: STUDENT IN AN ORGANIZED HEALTH CARE EDUCATION/TRAINING PROGRAM

## 2025-01-27 NOTE — PROGRESS NOTES
Chief Complaint   Patient presents with    Left Knee - Follow-up     MMT and LMT 1/14/2025       History of Present Illness  Rosetta is a 49-year-old female presenting today following left knee arthroscopy partial medial and lateral meniscectomy 1/14/2025.  Patient returns today noting minimal pain.  Endorsing moderate swelling however this is improving.  Denies any calf pain or shortness of breath.       Exam  Mild effusion  Healthy incisions - no active drainage  Good range of motion  No calf swelling  Negative Carolin´s test  Distal neurovascular exam intact     Assessment  Patient status post left knee arthroscopy partial medial and lateral meniscectomy, 2 weeks out     Plan  Reviewed arthroscopic photos and findings.  Discussed short and long term implications for the knee.  Discussed analgesics, ice, rest.  Encouraged home exercise program, physical therapy.  Follow-up at 4 weeks  XRays at follow up none    Arthroscopic findings of suprapatellar pouch synovitis, grade 3 Outerbridge changes patella, grade 4 of the trochlea, grade 4 of the medial femoral condyle, grade 3 of the medial tibial plateau, grade 3 of the lateral femoral condyle, grade 3 of the lateral tibial plateau, complex macerated tear posterior horn and body medial meniscus, complex macerated tear body posterior horn lateral meniscus, medial and lateral gutter adhesions all discussed with patient in detail    Hang Bean PA-C

## 2025-02-24 ENCOUNTER — OFFICE VISIT (OUTPATIENT)
Dept: ORTHOPEDIC SURGERY | Facility: CLINIC | Age: 50
End: 2025-02-24
Payer: COMMERCIAL

## 2025-02-24 DIAGNOSIS — Z98.890 S/P MEDIAL MENISCUS REPAIR OF LEFT KNEE: Primary | ICD-10-CM

## 2025-02-24 DIAGNOSIS — Z98.890 S/P LATERAL MENISCUS REPAIR OF LEFT KNEE: ICD-10-CM

## 2025-02-24 PROCEDURE — 99211 OFF/OP EST MAY X REQ PHY/QHP: CPT | Performed by: STUDENT IN AN ORGANIZED HEALTH CARE EDUCATION/TRAINING PROGRAM

## 2025-02-24 RX ORDER — METHYLPREDNISOLONE 4 MG/1
TABLET ORAL
Qty: 1 EACH | Refills: 0 | Status: SHIPPED | OUTPATIENT
Start: 2025-02-24

## 2025-02-24 NOTE — PROGRESS NOTES
Chief Complaint   Patient presents with    Left Knee - Follow-up     MMT and LMT 1/14/2025       History of Present Illness  Patient returns today  improving activity level. Denies any calf pain or shortness of breath.    Patient is 6-week status post knee arthroscopy.  Did have significant relief immediately postoperatively however over the past couple days feels like she has had some return of her pain.  Particular with her first couple steps of ambulating     Exam  Trace effusion  Well healed incisions   Full range of motion   Negative Carolin´s test  Distal neurovascular exam intact     Assessment  Patient status post left knee arthroscopy partial medial lateral meniscectomy     Plan  Discussed return to activity and home exercise program.  Encouraged home exercise program, physical therapy.   XRays at follow up none  We will provide an oral Medrol Dosepak today  Will assess progress next time at her visit in 2 months if fails to make significant progress may benefit from physical therapy    Arthroscopic findings of suprapatellar pouch synovitis, grade 3 Outerbridge changes patella, grade 4 of the trochlea, grade 4 of the medial femoral condyle, grade 3 of the medial tibial plateau, grade 3 of the lateral femoral condyle, grade 3 of the lateral tibial plateau, complex macerated tear posterior horn and body medial meniscus, complex macerated tear body posterior horn lateral meniscus, medial and lateral gutter adhesions all discussed with patient in detail

## 2025-04-25 ENCOUNTER — APPOINTMENT (OUTPATIENT)
Dept: ORTHOPEDIC SURGERY | Facility: CLINIC | Age: 50
End: 2025-04-25
Payer: COMMERCIAL

## 2025-04-25 DIAGNOSIS — Z98.890 S/P LATERAL MENISCUS REPAIR OF LEFT KNEE: ICD-10-CM

## 2025-04-25 DIAGNOSIS — Z98.890 S/P MEDIAL MENISCUS REPAIR OF LEFT KNEE: ICD-10-CM

## 2025-04-25 DIAGNOSIS — Z98.890 STATUS POST ARTHROSCOPY OF LEFT KNEE: ICD-10-CM

## 2025-04-25 ASSESSMENT — PAIN SCALES - GENERAL: PAINLEVEL_OUTOF10: 4

## 2025-04-25 ASSESSMENT — PAIN - FUNCTIONAL ASSESSMENT: PAIN_FUNCTIONAL_ASSESSMENT: 0-10

## 2025-04-25 NOTE — PROGRESS NOTES
Chief Complaint   Patient presents with    Left Knee - Follow-up     MMT and LMT 1/14/2025  3 MONTHS 11 DAY OUT       History of Present Illness  Patient returns today  improving activity level. Denies any calf pain or shortness of breath.    49-year-old female 3-month status post knee arthroscopy partial medial lateral meniscectomy.  Known history of rheumatoid arthritis.  Did get immediate relief with surgery however has had return of pain particularly with the first couple steps of ambulating and then after she does get warmed up it does feel little bit better.  Has not done any therapy yet.     Exam  Trace effusion  Well healed incisions   Full range of motion   Negative Carolin´s test  Distal neurovascular exam intact     Assessment  Patient status post left knee arthroscopy partial medial lateral meniscectomy     Plan  Discussed return to activity and home exercise program.  Encouraged physical therapy.   XRays at follow up 3 views left knee   Discussed activities to avoid as well as importance of using pain as a guide  May benefit from intra-articular cortisone injection.  Discussed that she did have findings consistent with severe arthritis about her knee likely leading to her current problems.  Would likely benefit from total knee arthroplasty at some point in time.  Discussed importance of weight loss to help offload the joint.  She is going to continue working on this she did lose 35 pounds since last visit.

## 2025-05-19 ENCOUNTER — APPOINTMENT (OUTPATIENT)
Dept: PHYSICAL THERAPY | Facility: CLINIC | Age: 50
End: 2025-05-19
Payer: COMMERCIAL

## 2025-05-30 ENCOUNTER — APPOINTMENT (OUTPATIENT)
Dept: PHYSICAL THERAPY | Facility: CLINIC | Age: 50
End: 2025-05-30
Payer: COMMERCIAL

## 2025-06-06 ENCOUNTER — APPOINTMENT (OUTPATIENT)
Dept: ORTHOPEDIC SURGERY | Facility: CLINIC | Age: 50
End: 2025-06-06
Payer: COMMERCIAL

## 2025-06-06 ENCOUNTER — APPOINTMENT (OUTPATIENT)
Dept: PHYSICAL THERAPY | Facility: CLINIC | Age: 50
End: 2025-06-06
Payer: COMMERCIAL

## 2025-06-11 ENCOUNTER — HOSPITAL ENCOUNTER (OUTPATIENT)
Dept: RADIOLOGY | Facility: CLINIC | Age: 50
Discharge: HOME | End: 2025-06-11
Payer: COMMERCIAL

## 2025-06-11 ENCOUNTER — APPOINTMENT (OUTPATIENT)
Dept: ORTHOPEDIC SURGERY | Facility: CLINIC | Age: 50
End: 2025-06-11
Payer: COMMERCIAL

## 2025-06-11 DIAGNOSIS — Z98.890 S/P MEDIAL MENISCUS REPAIR OF LEFT KNEE: ICD-10-CM

## 2025-06-11 DIAGNOSIS — M17.5 OTHER SECONDARY OSTEOARTHRITIS OF LEFT KNEE: Primary | ICD-10-CM

## 2025-06-11 PROCEDURE — 99212 OFFICE O/P EST SF 10 MIN: CPT | Performed by: STUDENT IN AN ORGANIZED HEALTH CARE EDUCATION/TRAINING PROGRAM

## 2025-06-11 PROCEDURE — 73562 X-RAY EXAM OF KNEE 3: CPT | Mod: LT

## 2025-06-11 PROCEDURE — 73562 X-RAY EXAM OF KNEE 3: CPT | Mod: LEFT SIDE | Performed by: STUDENT IN AN ORGANIZED HEALTH CARE EDUCATION/TRAINING PROGRAM

## 2025-06-11 PROCEDURE — 20610 DRAIN/INJ JOINT/BURSA W/O US: CPT | Mod: LT | Performed by: STUDENT IN AN ORGANIZED HEALTH CARE EDUCATION/TRAINING PROGRAM

## 2025-06-11 PROCEDURE — 99214 OFFICE O/P EST MOD 30 MIN: CPT | Performed by: STUDENT IN AN ORGANIZED HEALTH CARE EDUCATION/TRAINING PROGRAM

## 2025-06-11 PROCEDURE — 2500000004 HC RX 250 GENERAL PHARMACY W/ HCPCS (ALT 636 FOR OP/ED): Performed by: STUDENT IN AN ORGANIZED HEALTH CARE EDUCATION/TRAINING PROGRAM

## 2025-06-11 RX ORDER — TRIAMCINOLONE ACETONIDE 40 MG/ML
40 INJECTION, SUSPENSION INTRA-ARTICULAR; INTRAMUSCULAR
Status: COMPLETED | OUTPATIENT
Start: 2025-06-11 | End: 2025-06-11

## 2025-06-11 RX ORDER — LIDOCAINE HYDROCHLORIDE 10 MG/ML
4 INJECTION, SOLUTION INFILTRATION; PERINEURAL
Status: COMPLETED | OUTPATIENT
Start: 2025-06-11 | End: 2025-06-11

## 2025-06-11 RX ADMIN — TRIAMCINOLONE ACETONIDE 40 MG: 40 INJECTION, SUSPENSION INTRA-ARTICULAR; INTRAMUSCULAR at 11:02

## 2025-06-11 RX ADMIN — LIDOCAINE HYDROCHLORIDE 4 ML: 10 INJECTION, SOLUTION INFILTRATION; PERINEURAL at 11:02

## 2025-06-11 NOTE — PROGRESS NOTES
Chief Complaint   Patient presents with    Left Knee - Follow-up     MMT and LMT 1/14/2025  4 MONTHS 28 DAY OUT  Xrays today       History of Present Illness  Patient returns today  improving activity level. Denies any calf pain or shortness of breath.    49-year-old female 4-month status post knee arthroscopy partial medial lateral meniscectomy.  Known history of rheumatoid arthritis.  Did get immediate relief with surgery however has had return of pain particularly with the first couple steps of ambulating and then after she does get warmed up it does feel little bit better.  Since last visit has participated with physical therapy.  Does state that she feels good working with them getting strength back however this does seem to be an aggravating factor for her knee.  Really looking for any form of relief for this knee as it is extremely limiting her daily life.  She has had to forego many activities that she wants to be doing even staying in the car instead of going into stores with her family.     Exam  Trace effusion  Well healed incisions   Full range of motion   Negative Carolin´s test  Distal neurovascular exam intact       L Inj/Asp: L knee on 6/11/2025 11:02 AM  Indications: pain  Details: 22 G needle, anterolateral approach  Medications: 40 mg triamcinolone acetonide 40 mg/mL; 4 mL lidocaine 10 mg/mL (1 %)  Consent was given by the patient. Immediately prior to procedure a time out was called to verify the correct patient, procedure, equipment, support staff and site/side marked as required. Patient was prepped and draped in the usual sterile fashion.           Assessment  Patient status post left knee arthroscopy partial medial lateral meniscectomy     Plan  Discussed return to activity and home exercise program.  Encouraged continuation of physical therapy exercises.   Discussed activities to avoid as well as importance of using pain as a guide  Did discuss further treatment modalities including physical  therapy, oral anti-inflammatories, bracing, intra-articular cortisone injections, gel injections, ultimately total knee arthroplasty as well as risk benefits contraindications and alternatives to each.  Patient has not attempted any form of injection therapy at this point in time.  She does have some hesitation as she is looking more for a fix rather than just temporary symptomatic relief however would like to proceed with trial of one-time cortisone injection today.  I discussed risks and benefits of corticosteroid injection and the patient would like to proceed.  Discussed risks of skin depigmentation and the rare but possible intravascular injection of local anesthetic which can cause palpitations or arrhythmias. I discussed the possibility of a transient increase in blood sugar. I explained that the local anesthetic tends to work immediately, it may take 2-3 days for the full effect of the corticosteroid compound. Consent was obtained and side confirmed by the patient.  Reviewed the importance of weight loss to help offload the joint.  She is going to continue working on this she did lose 35 pounds since last visit.  Patient will follow-up in 2 months for repeat evaluation.  If she fails to improve with cortisone injection may benefit from discussion of viscosupplementation versus total knee arthroplasty    Hang Bean PA-C    In a face to face encounter, I evaluated the patient and performed a physical examination, discussed pertinent diagnostic studies if indicated and discussed diagnosis and management strategies with both the patient and physician assistant / nurse practitioner.  I reviewed the PA/NP's note and agree with the documented findings and plan of care.     49-year-old female with known history of rheumatoid arthritis.  She is status post knee arthroscopies provide mild relief.  Continues to have pain discomfort about her knee affecting her everyday activities has been working with physical  therapy also been working on weight loss which she has been successful with.  Current BMI 39.  On examination she has crepitus with range of motion no surrounding erythema.  Stable ligamentous exam.  Tenderness palpation diffusely about the knee.  Discussed that she would likely benefit from total knee arthroplasty at some point in time she does wish to proceed with a cortisone injection at this point in time to give her some relief through the summer open proceed with replacement sometime-year-old early or late fall.  Discussed activities to avoid as well as importance of using pain as a guide we will have her follow-up in 2 months  Praveen Moreira III, MD

## 2025-06-13 ENCOUNTER — APPOINTMENT (OUTPATIENT)
Dept: PHYSICAL THERAPY | Facility: CLINIC | Age: 50
End: 2025-06-13
Payer: COMMERCIAL

## 2025-06-20 ENCOUNTER — APPOINTMENT (OUTPATIENT)
Dept: PHYSICAL THERAPY | Facility: CLINIC | Age: 50
End: 2025-06-20
Payer: COMMERCIAL

## 2025-07-03 ENCOUNTER — OFFICE VISIT (OUTPATIENT)
Dept: URGENT CARE | Age: 50
End: 2025-07-03
Payer: COMMERCIAL

## 2025-07-03 VITALS
SYSTOLIC BLOOD PRESSURE: 139 MMHG | DIASTOLIC BLOOD PRESSURE: 90 MMHG | WEIGHT: 219 LBS | TEMPERATURE: 96.3 F | HEART RATE: 90 BPM | OXYGEN SATURATION: 98 % | BODY MASS INDEX: 36.49 KG/M2 | HEIGHT: 65 IN | RESPIRATION RATE: 18 BRPM

## 2025-07-03 DIAGNOSIS — J02.9 SORE THROAT: ICD-10-CM

## 2025-07-03 LAB
POC CORONAVIRUS SARS-COV-2 PCR: NEGATIVE
POC HUMAN RHINOVIRUS PCR: NEGATIVE
POC INFLUENZA A VIRUS PCR: NEGATIVE
POC INFLUENZA B VIRUS PCR: NEGATIVE
POC RAPID STREP: NEGATIVE
POC RESPIRATORY SYNCYTIAL VIRUS PCR: NEGATIVE

## 2025-07-03 RX ORDER — FOLIC ACID 1 MG/1
1 TABLET ORAL DAILY
COMMUNITY

## 2025-07-03 ASSESSMENT — ENCOUNTER SYMPTOMS: SORE THROAT: 1

## 2025-07-03 NOTE — PROGRESS NOTES
"Subjective   Patient ID: Rosetta Morton is a 50 y.o. female. They present today with a chief complaint of Sore Throat (Drainage/Started 2-3 days ago ).    History of Present Illness    Sore Throat       Pt is a 51 y/o female presenting with sore throat x 3 days. Notes pain is intermittent sharp aching pains, worse in morning. Pain relieved throughout the day. Notes mild post nasal drainage from seasonal allergies. Has been taking her Flonase with mild relief. Notes she was exposed to strep last week at work, wants testing. Denies fevers, chills, body aches, trouble swallowing, drooling, any other symptoms.    Past Medical History  Allergies as of 07/03/2025 - Reviewed 07/03/2025   Allergen Reaction Noted    Azithromycin Unknown 03/01/2016    Erythromycin (bulk) GI Upset 10/14/2015    Levofloxacin Unknown 07/28/2016    Tetracycline Diarrhea 10/14/2015       Prescriptions Prior to Admission[1]     Medical History[2]    Surgical History[3]     reports that she has never smoked. She has never been exposed to tobacco smoke. She has never used smokeless tobacco.    Review of Systems  Review of Systems   HENT:  Positive for postnasal drip and sore throat.    All other systems reviewed and are negative.                                 Objective    Vitals:    07/03/25 1338   BP: 139/90   BP Location: Left arm   Patient Position: Sitting   Pulse: 90   Resp: 18   Temp: 35.7 °C (96.3 °F)   SpO2: 98%   Weight: 99.3 kg (219 lb)   Height: 1.651 m (5' 5\")     Patient's last menstrual period was 06/19/2025 (approximate).    Physical Exam  Constitutional:       Appearance: Normal appearance.   HENT:      Head: Normocephalic and atraumatic.      Right Ear: Tympanic membrane normal.      Left Ear: Tympanic membrane normal.      Nose: Nose normal. No congestion or rhinorrhea.      Mouth/Throat:      Mouth: Mucous membranes are moist.      Pharynx: Oropharynx is clear. No oropharyngeal exudate or posterior oropharyngeal erythema. "   Eyes:      Extraocular Movements: Extraocular movements intact.      Conjunctiva/sclera: Conjunctivae normal.   Pulmonary:      Effort: Pulmonary effort is normal. No respiratory distress.   Musculoskeletal:         General: Normal range of motion.      Cervical back: Normal range of motion and neck supple.   Lymphadenopathy:      Cervical: No cervical adenopathy.   Neurological:      General: No focal deficit present.      Mental Status: She is alert and oriented to person, place, and time.   Psychiatric:         Mood and Affect: Mood normal.         Behavior: Behavior normal.         Procedures    Point of Care Test & Imaging Results from this visit  Results for orders placed or performed in visit on 07/03/25   POCT rapid strep A manually resulted   Result Value Ref Range    POC Rapid Strep Negative Negative   POCT SPOTFIRE R/ST Panel Mini w/COVID ([facility]) manually resulted    Specimen: Swab   Result Value Ref Range    POC Sars-Cov-2 PCR Negative Negative    POC Respiratory Syncytial Virus PCR Negative Negative    POC Influenza A Virus PCR Negative Negative    POC Influenza B Virus PCR Negative Negative    POC Human Rhinovirus PCR Negative Negative      Imaging  No results found.    Cardiology, Vascular, and Other Imaging  No other imaging results found for the past 2 days      Diagnostic study results (if any) were reviewed by Jennifer Velasco PA-C.    Assessment/Plan   Allergies, medications, history, and pertinent labs/EKGs/Imaging reviewed by Jennifer Velasco PA-C.     Medical Decision Making  VSS, pt in NAD. POC testing negative at this time. No signs of peritonsillar abscess, oropharynx with mild postnasal drip. Likely seasonal allergy induced. Advised pt to continue allergy medications, add antihistamine to regimen x 1-2 weeks. Advised use of humidifier. Ibuprofen/tylenol prn for pain. Follow up with pcp or come back for possible retesting if symptoms persist or worsen. Educated on alarming s/s and when  to seek ED care. Pt agreeable.    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

## 2025-08-14 ENCOUNTER — APPOINTMENT (OUTPATIENT)
Dept: ORTHOPEDIC SURGERY | Facility: CLINIC | Age: 50
End: 2025-08-14
Payer: COMMERCIAL

## 2025-09-05 ENCOUNTER — APPOINTMENT (OUTPATIENT)
Dept: ORTHOPEDIC SURGERY | Facility: CLINIC | Age: 50
End: 2025-09-05
Payer: COMMERCIAL

## 2025-09-15 ENCOUNTER — APPOINTMENT (OUTPATIENT)
Dept: ORTHOPEDIC SURGERY | Facility: CLINIC | Age: 50
End: 2025-09-15
Payer: COMMERCIAL